# Patient Record
(demographics unavailable — no encounter records)

---

## 2025-03-03 NOTE — HISTORY OF PRESENT ILLNESS
[de-identified] : Slovak translation via [facility] Arce  Patient is a 70 year old male who underwent back mass, right neck mass, and right shoulder mass excision in 5/22/2024, who was last seen in September 2024 with discomfort of the upper back. States that since that visit, those symptoms have resolved, but presents with concerns of a new mass in the left upper back. States that the mass has been more noticeable after surgery, and while not associated with pain, is causing concern. No other symptoms at this time.

## 2025-03-03 NOTE — PLAN
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

## 2025-03-03 NOTE — PHYSICAL EXAM
[Respiratory Effort] : normal respiratory effort [Normal Rate and Rhythm] : normal rate and rhythm [Alert] : alert [Oriented to Person] : oriented to person [Oriented to Place] : oriented to place [Oriented to Time] : oriented to time [Calm] : calm [de-identified] : NAD [de-identified] : Soft, non-distended, non-TTP in all quadrants, no rebound/guarding [de-identified] : Well-healed upper back mass excision site, no erythema, non-TTP, approximately 4 cm ill-defined mass-like protrusion of the left upper back